# Patient Record
(demographics unavailable — no encounter records)

---

## 2024-10-15 NOTE — REVIEW OF SYSTEMS
[Recent Weight Loss (___ Lbs)] : recent [unfilled] ~Ulb weight loss [Dry Eyes] : dryness of the eyes [As Noted in HPI] : as noted in HPI [Joint Pain] : joint pain [Negative] : Heme/Lymph

## 2024-10-18 NOTE — ASSESSMENT
[FreeTextEntry1] : Impression: GUY BLOOM is a 70 year old man who presents for follow up office visit for further evaluation of joint symptoms and rheumatic diseases including MCTD, osteoarthritis, osteopenia, Sjogren's syndrome, Raynaud's, and previous history of dermatomyositis, interstitial lung disease/pulmonary fibrosis.   Daily Raynauds several times per day - not very bothersome. No rash, oral/genital ulcers, alopecia, chest pain, fever,  or other joint symptoms - with his MCTD quiescent. Occasional right shoulder pain Secondary to osteoarthritis. From previous physical exam mild tenderness plantar arch of left foot - resolved. On exam pt has flexor tenosynovitis right 1st finger contributing to his joint pain and mild diffuse swelling bilateral fingers secondary to MCTD. Some recent dry eyes and dry mouth - on exam pt has dry eyes, tongue moist and mild enlarged bilateral parotid glands nontender Secondary to Sjogren Syndrome. Using artificial tears, biotene mouthwash, oral hydration and cevimeline t.i.d. with relief. Denies dysphagia, difficulty fixing hair, difficulty with stairs, or weakness with his remote history of dermatomyositis quiescent. No respiratory symptoms, shortness of breath or cough/sputum production, hemoptysis, ankle swelling, other joint symptoms - with his interstitial lung disease/pulmonary fibrosis stable.  He continues Ofev via pulmonary.  Short course prednisone very helpful. Gym daily for 2 hours with [employer] classes and weights. Recent laboratory tests results reveal no significant changes or results, with extensive discussion. Recent xray results revealed left calcaneal spurs, osteoarthritis in the right shoulder -- with extensive discussion. Recent MRI of bilateral parotid gland results revealed "multinodular appearance ... that is typical with Sjogren Syndrome" -- with extensive discussion. The patient continues calcium 500 mg and vitamin D supplements for osteopenia. Patient denies rash or side effects with current medications. Patient is content with current medication regimen.  Plan: I reviewed patient's chart and previous records I reviewed recent lab test results with the patient  with extensive discussion  X-rays results reviewed with the patient with extensive discussion I reviewed recent MRI of bilateral parotid gland with patient with extensive discussion Laboratory tests ordered - see list below - with coordination of care Ophthalmology f/u for Plaquenil monitoring q.6 months - emphasized  -- please send me a consult report Diagnosis and prognosis discussed Continue current medications (other than those changed below) Patient declines any changes or additions to medication regimen. Artificial tears one drop each eye q.i.d. and p.r.n.(Possible side effects explained) Biotene mouthwash/spray q.i.d. and p.r.n.(Possible side effects explained) Oral Hydration Sun protection and sunscreen with an SPF of 50 or higher use emphasized--extensive discussion Keep hands and feet and torso warm - patient warned of dangers of gangrene/digital amputation with Raynaud's -- with extensive discussion Increase home thermostat to at least 72 to 74 F -- with extensive discussion Pulmonary follow-up Return visit 2 months All questions and concerns were addressed. Total time for this office visit, including face-to-face time and non-face-to-face time, 72 minutes--- including review of the chart and previous records, detailed review of his medical history, review of previous lab results with extensive discussion with the patient, ordering lab tests with coordination of care, review of recent imaging reports/x-ray results with extensive discussion with the patient, review of his recent MRI bilateral parotid glands with extensive discussion, detailed medication history, review of medications going forward with their possible side effects, reviewed protocol for prevention of Raynaud's with extensive discussion as noted above, reviewed modalities for treatment of his dryness, reviewed protocol for sun protection and use of sunscreen with extensive discussion

## 2024-10-18 NOTE — ADDENDUM
[FreeTextEntry1] :  I, Paulie Rahman, acted solely as a scribe for Dr. Myron I. Kleiner, MD. on 10/15/2024. I personally performed the services described in the documentation, reviewed the documentation recorded by the scribe in my presence, and it accurately and completely records my words and actions.

## 2024-10-18 NOTE — PHYSICAL EXAM
[General Appearance - Alert] : alert [General Appearance - In No Acute Distress] : in no acute distress [General Appearance - Well Nourished] : well nourished [General Appearance - Well Developed] : well developed [General Appearance - Well-Appearing] : healthy appearing [Sclera] : the sclera and conjunctiva were normal [PERRL With Normal Accommodation] : pupils were equal in size, round, and reactive to light [Extraocular Movements] : extraocular movements were intact [Oropharynx] : the oropharynx was normal [Outer Ear] : the ears and nose were normal in appearance [Neck Appearance] : the appearance of the neck was normal [Neck Cervical Mass (___cm)] : no neck mass was observed [Jugular Venous Distention Increased] : there was no jugular-venous distention [Thyroid Diffuse Enlargement] : the thyroid was not enlarged [Lungs Percussion] : the lungs were normal to percussion [Heart Rate And Rhythm] : heart rate was normal and rhythm regular [Edema] : there was no peripheral edema [Abdomen Soft] : soft [Abdomen Tenderness] : non-tender [Abdomen Mass (___ Cm)] : no abdominal mass palpated [Cervical Lymph Nodes Enlarged Posterior Bilaterally] : posterior cervical [Cervical Lymph Nodes Enlarged Anterior Bilaterally] : anterior cervical [Supraclavicular Lymph Nodes Enlarged Bilaterally] : supraclavicular [Axillary Lymph Nodes Enlarged Bilaterally] : axillary [No CVA Tenderness] : no ~M costovertebral angle tenderness [No Spinal Tenderness] : no spinal tenderness [Skin Color & Pigmentation] : normal skin color and pigmentation [Skin Turgor] : normal skin turgor [] : no rash [Cranial Nerves] : cranial nerves 2-12 were intact [Sensation] : the sensory exam was normal to light touch and pinprick [Motor Exam] : the motor exam was normal [No Focal Deficits] : no focal deficits [Oriented To Time, Place, And Person] : oriented to person, place, and time [Impaired Insight] : insight and judgment were intact [Affect] : the affect was normal [Mood] : the mood was normal [FreeTextEntry1] : Shoulders/Wrists/Elbow- Normal  Hands- early Heberden's nodes, mild diffuse swelling bilateral fingers, flexor tenosynovitis right 1st finger,    Hips- bilateral decreased external rotation  Knees- bilateral crepitus Ankles/Feet- normal

## 2024-10-18 NOTE — CONSULT LETTER
[Dear  ___] : Dear  [unfilled], [Consult Letter:] : I had the pleasure of evaluating your patient, [unfilled]. [Please see my note below.] : Please see my note below. [Consult Closing:] : Thank you very much for allowing me to participate in the care of this patient.  If you have any questions, please do not hesitate to contact me. [Sincerely,] : Sincerely, [DrMary  ___] : Dr. KLINE [DrMary ___] : Dr. KLINE [___] : [unfilled] [FreeTextEntry3] :       Myron Myron I. Kleiner, M.D., FACR   Chief, Division of Rheumatology  Department of Medicine   [facility]   Chief, Division of Rheumatology  Department of Medicine   [facility]  Myron Myron I. Kleiner, M.D., FACR   Chief, Division of Rheumatology  Department of Medicine   [facility]   Chief, Division of Rheumatology  Department of [facility]

## 2024-10-18 NOTE — CONSULT LETTER
[Dear  ___] : Dear  [unfilled], [Consult Letter:] : I had the pleasure of evaluating your patient, [unfilled]. [Please see my note below.] : Please see my note below. [Consult Closing:] : Thank you very much for allowing me to participate in the care of this patient.  If you have any questions, please do not hesitate to contact me. [Sincerely,] : Sincerely, [DrMary  ___] : Dr. KLINE [DrMary ___] : Dr. KLINE [___] : [unfilled] [FreeTextEntry3] :       Myron Myron I. Kleiner, M.D., FACR   Chief, Division of Rheumatology  Department of Medicine   [facility]   Chief, Division of Rheumatology  Department of Medicine   [facility]  Myron Myron I. Kleiner, M.D., FACR   Chief, Division of Rheumatology  Department of Medicine   [facility]   Chief, Division of Rheumatology  Department of [facility]

## 2024-10-18 NOTE — HISTORY OF PRESENT ILLNESS
[FreeTextEntry1] : GUY BLOOM is a 70 year old man who presents for follow up office visit for further evaluation of joint symptoms and rheumatic diseases including MCTD, osteoarthritis, Sjogren's syndrome, Raynaud's, osteopenia, and previous history of dermatomyositis, interstitial lung disease/pulmonary fibrosis.   Daily Raynauds several times per day - not very bothersome.  No rash, oral/genital ulcers, alopecia, chest pain, fever,  or other joint symptoms. Occasional right shoulder pain. Some recent dry eyes and dry mouth. Using artificial tears, biotene mouthwash, oral hydration and cevimeline t.i.d. with relief. Denies dysphagia, difficulty fixing hair, difficulty with stairs, or weakness. No respiratory symptoms, shortness of breath or cough/sputum production, hemoptysis, ankle swelling, other joint symptoms.  On Ofev via pulmonary.  Short course prednisone very helpful. Gym daily for 2 hours with [employer] classes and weights. The patient continues calcium 500 mg and vitamin D supplements. Patient denies rash or side effects with current medications. Patient is content with current medication regimen.  PMH August covid postive no medication no hospitalization sx cough

## 2024-12-15 NOTE — CONSULT LETTER
[Dear  ___] : Dear  [unfilled], [Consult Letter:] : I had the pleasure of evaluating your patient, [unfilled]. [Please see my note below.] : Please see my note below. [Consult Closing:] : Thank you very much for allowing me to participate in the care of this patient.  If you have any questions, please do not hesitate to contact me. [Sincerely,] : Sincerely, [DrMary  ___] : Dr. KLINE [DrMary ___] : Dr. KLINE [___] : [unfilled] [FreeTextEntry3] :       Myron Myron I. Kleiner, M.D., FACR   Chief, Division of Rheumatology  Department of Medicine   [facility]   Chief, Division of Rheumatology  Department of Medicine   [facility]  Myron Myron I. Kleiner, M.D., FACR   Chief, Division of Rheumatology  Department of Medicine   [facility]   Chief, Division of Rheumatology  Department of [facility]

## 2024-12-15 NOTE — ASSESSMENT
[FreeTextEntry1] : Impression: GUY BLOOM is a 70 year old man who presents for follow up office visit for further evaluation of joint symptoms and rheumatic diseases including MCTD, osteoarthritis, Sjogren's syndrome, osteopenia, Raynaud's, and previous history of dermatomyositis, interstitial lung disease/pulmonary fibrosis. Accompanied buy his wife  Intermittent pain right knee, right shoulder, and plantar aspect left heel Secondary to osteoarthritis. Paresthesias bilateral fingers anytime of day consider bilateral carpal tunnel syndrome vs cervical radiculopathy. Occasional Raynauds Episodes -- not bothersome. On exam pt has flexor tenosynovitis right 1st finger contributing to his joint pain and mild diffuse swelling bilateral fingers secondary to MCTD. No rash, oral/genital ulcers, alopecia, chest pain, fever, or other joint symptoms with his MCTD otherwise quiescent. Denies dysphagia, difficulty fixing hair, difficulty shaving, difficulty with stairs, or weakness with his remote history of dermatomyositis quiescent. Some recent dry eyes and dry mouth - on exam pt has dry eyes, tongue moist and mild enlarged bilateral parotid glands nontender Secondary to Sjogren Syndrome. Using artificial tears, biotene mouthwash, oral hydration and cevimeline t.i.d. with relief. Applies Diclofenac gel to his right knee, right shoulder, left heel twice daily with relief. Pt described "roughen skin" bilateral 2nd and 3rd fingers despite moisturizing. Recent lab tests results revealed ESR 35, otherwise unrevealing-- with extensive discussion. Recent Chest CT scan results from revealed diffuse interstitial pulmonary fibrosis without change from previous study and small 10 X 8 millimeter subpleural nodule right lung base smaller compared to previous study -- with extensive discussion. The patient continues calcium 500 mg and vitamin D supplements for osteopenia. Patient denies rash or side effects with current medications. Patient is content with current medication regimen.  Plan: I reviewed patient's chart and previous records I reviewed recent lab test results with the patient and his wife with extensive discussion  Recent Chest CT scan results reviewed with the patient and his wife with extensive discussion Laboratory tests ordered - see list below - with coordination of care Diagnosis and prognosis discussed Continue current medications (other than those changed below) Consider switching the nabumetone to alternative NSAID--pt declined alternative NSAIDs - he wants to wait till after his [place] cruise--extensive discussion Patient declines any changes or additions to medication regimen. Artificial tears one drop each eye q.i.d. and p.r.n.(Possible side effects explained) Biotene mouthwash/spray q.i.d. and p.r.n.(Possible side effects explained) Oral Hydration Sun protection and sunscreen with an SPF of 50 or higher use emphasized--extensive discussion Keep hands and feet and torso warm - patient warned of dangers of gangrene/digital amputation with Raynaud's -- with extensive discussion Increase home thermostat to at least 72 to 74 F -- with extensive discussion Ophthalmology f/u for Plaquenil monitoring q.6 months - emphasized  -- please send me a consult report Return visit 2 months All questions and concerns were addressed. Total time for this office visit, including face-to-face time and non-face-to-face time, 71 minutes--- including review of the chart and previous records, detailed review of his medical history, review of previous lab results with extensive discussion with the patient and his wife, ordering lab tests with coordination of care, review of previous imaging reports/x-ray results with extensive discussion with the patient, review of recent chest CAT scan results with extensive discussion with the patient and his wife, detailed medication history, review of medications going forward with their possible side effects, reviewed protocol for prevention of Raynaud's with extensive discussion as noted above, reviewed modalities for treatment of his dryness with extensive discussion, reviewed protocol for sun protection and use of sunscreen with extensive discussion with the patient and his wife, extensive discussion regarding switching nabumetone to alternative NSAID but he declined at this time as noted above

## 2024-12-15 NOTE — PHYSICAL EXAM
[General Appearance - Alert] : alert [General Appearance - In No Acute Distress] : in no acute distress [General Appearance - Well Nourished] : well nourished [General Appearance - Well Developed] : well developed [General Appearance - Well-Appearing] : healthy appearing [Sclera] : the sclera and conjunctiva were normal [PERRL With Normal Accommodation] : pupils were equal in size, round, and reactive to light [Extraocular Movements] : extraocular movements were intact [Outer Ear] : the ears and nose were normal in appearance [Oropharynx] : the oropharynx was normal [Neck Appearance] : the appearance of the neck was normal [Neck Cervical Mass (___cm)] : no neck mass was observed [Jugular Venous Distention Increased] : there was no jugular-venous distention [Thyroid Diffuse Enlargement] : the thyroid was not enlarged [Lungs Percussion] : the lungs were normal to percussion [Cervical Lymph Nodes Enlarged Posterior Bilaterally] : posterior cervical [Cervical Lymph Nodes Enlarged Anterior Bilaterally] : anterior cervical [Supraclavicular Lymph Nodes Enlarged Bilaterally] : supraclavicular [Axillary Lymph Nodes Enlarged Bilaterally] : axillary [No CVA Tenderness] : no ~M costovertebral angle tenderness [No Spinal Tenderness] : no spinal tenderness [Heart Rate And Rhythm] : heart rate was normal and rhythm regular [Edema] : there was no peripheral edema [Skin Color & Pigmentation] : normal skin color and pigmentation [Skin Turgor] : normal skin turgor [] : no rash [Sensation] : the sensory exam was normal to light touch and pinprick [No Focal Deficits] : no focal deficits [Motor Exam] : the motor exam was normal [FreeTextEntry1] : Aravind-5/5  [Oriented To Time, Place, And Person] : oriented to person, place, and time [Impaired Insight] : insight and judgment were intact [Affect] : the affect was normal [Mood] : the mood was normal

## 2024-12-15 NOTE — HISTORY OF PRESENT ILLNESS
[FreeTextEntry1] : GUY BLOOM is a 70 year old man who presents for follow up office visit for further evaluation of joint symptoms and rheumatic diseases including MCTD, osteoarthritis, osteopenia, Sjogren's syndrome, Raynaud's, and previous history of dermatomyositis, interstitial lung disease/pulmonary fibrosis. Accompanied by his wife   Intermittent pain right knee, right shoulders, and plantar aspect left heel. Paresthesias bilateral fingers anytime of day. Occasional Raynauds Episodes -- not bothersome.  No rash, oral/genital ulcers, alopecia, chest pain, fever, Raynaud's, or other joint symptoms. Denies dysphagia, difficulty fixing hair, difficulty shaving, difficulty with stairs, or weakness. Some recent dry eyes and dry mouth. Using artificial tears, biotene mouthwash, oral hydration and cevimeline t.i.d. with relief. Applies Diclofenac gel to his right knee, right shoulder, left heel twice daily with relief. Pt described "roughen skin" bilateral 2nd and 3rd fingers despite moisturizing. The patient continues calcium 500 mg and vitamin D supplements. Patient denies rash or side effects with current medications. Patient is content with current medication regimen.  [facility] Ophthalmology - pt states "all ok"  SH January - planned [place] cruise

## 2024-12-15 NOTE — CONSULT LETTER
[Dear  ___] : Dear  [unfilled], [Consult Letter:] : I had the pleasure of evaluating your patient, [unfilled]. [Please see my note below.] : Please see my note below. [Consult Closing:] : Thank you very much for allowing me to participate in the care of this patient.  If you have any questions, please do not hesitate to contact me. [Sincerely,] : Sincerely, [DrMary  ___] : Dr. KLINE [DrMary ___] : Dr. LKINE [___] : [unfilled] [FreeTextEntry3] :       Myron Myron I. Kleiner, M.D., FACR   Chief, Division of Rheumatology  Department of Medicine   [facility]   Chief, Division of Rheumatology  Department of Medicine   [facility]  Myron Myron I. Kleiner, M.D., FACR   Chief, Division of Rheumatology  Department of Medicine   [facility]   Chief, Division of Rheumatology  Department of [facility]

## 2024-12-15 NOTE — HISTORY OF PRESENT ILLNESS
[FreeTextEntry1] : GUY BLOOM is a 70 year old man who presents for follow up office visit for further evaluation of joint symptoms and rheumatic diseases including MCTD, osteoarthritis, osteopenia, Sjogren's syndrome, Raynaud's, and previous history of dermatomyositis, interstitial lung disease/pulmonary fibrosis. Accompanied by his wife   Intermittent pain right knee, right shoulders, and plantar aspect left heel. Paresthesias bilateral fingers anytime of day. Occasional Raynauds Episodes -- not bothersome.  No rash, oral/genital ulcers, alopecia, chest pain, fever, Raynaud's, or other joint symptoms. Denies dysphagia, difficulty fixing hair, difficulty shaving, difficulty with stairs, or weakness. Some recent dry eyes and dry mouth. Using artificial tears, biotene mouthwash, oral hydration and cevimeline t.i.d. with relief. Applies Diclofenac gel to his right knee, right shoulder, left heel twice daily with relief. Pt described "roughen skin" bilateral 2nd and 3rd fingers despite moisturizing. The patient continues calcium 500 mg and vitamin D supplements. Patient denies rash or side effects with current medications. Patient is content with current medication regimen.  [facility] Ophthalmology - pt states "all ok"  SH January - planned [place] cruise

## 2024-12-15 NOTE — ADDENDUM
[FreeTextEntry1] :  I, Paulie Rahman, acted solely as a scribe for Dr. Myron I. Kleiner, MD. on 12/11/2024. I personally performed the services described in the documentation, reviewed the documentation recorded by the scribe in my presence, and it accurately and completely records my words and actions.

## 2025-03-03 NOTE — PHYSICAL EXAM
[General Appearance - Alert] : alert [General Appearance - In No Acute Distress] : in no acute distress [General Appearance - Well Nourished] : well nourished [General Appearance - Well Developed] : well developed [General Appearance - Well-Appearing] : healthy appearing [Sclera] : the sclera and conjunctiva were normal [PERRL With Normal Accommodation] : pupils were equal in size, round, and reactive to light [Extraocular Movements] : extraocular movements were intact [Outer Ear] : the ears and nose were normal in appearance [Oropharynx] : the oropharynx was normal [Neck Appearance] : the appearance of the neck was normal [Neck Cervical Mass (___cm)] : no neck mass was observed [Jugular Venous Distention Increased] : there was no jugular-venous distention [Thyroid Diffuse Enlargement] : the thyroid was not enlarged [] : no respiratory distress [Lungs Percussion] : the lungs were normal to percussion [Cervical Lymph Nodes Enlarged Posterior Bilaterally] : posterior cervical [Cervical Lymph Nodes Enlarged Anterior Bilaterally] : anterior cervical [Supraclavicular Lymph Nodes Enlarged Bilaterally] : supraclavicular [Axillary Lymph Nodes Enlarged Bilaterally] : axillary [No CVA Tenderness] : no ~M costovertebral angle tenderness [No Spinal Tenderness] : no spinal tenderness [FreeTextEntry1] : TMJs - mild tenderness left  Shoulders/Wrists/Elbow- Normal  Hands- early Heberden's nodes, mild diffuse swelling bilateral fingers, flexor tenosynovitis right 1st finger,    Hips- bilateral decreased external rotation  Knees- bilateral crepitus Ankles/Feet- normal   Strength 5/5

## 2025-03-03 NOTE — CONSULT LETTER
[Dear  ___] : Dear  [unfilled], [Consult Letter:] : I had the pleasure of evaluating your patient, [unfilled]. [Please see my note below.] : Please see my note below. [Consult Closing:] : Thank you very much for allowing me to participate in the care of this patient.  If you have any questions, please do not hesitate to contact me. [Sincerely,] : Sincerely, [DrMary  ___] : Dr. KLINE [DrMary ___] : Dr. KLINE [___] : [unfilled] [FreeTextEntry3] :       Myron Myron I. Kleiner, M.D., FACR   Chief, Division of Rheumatology  Department of Medicine   [facility]   Chief, Division of Rheumatology  Department of Medicine   [facility]  Myron Myron I. Kleiner, M.D., FACR   Chief, Division of Rheumatology  Department of Medicine   [facility]   Chief, Division of Rheumatology  Department of [facility]

## 2025-03-03 NOTE — ASSESSMENT
[FreeTextEntry1] : Impression: GUY BLOOM is a 70 year old man who presents for follow up office visit for further evaluation of joint symptoms and rheumatic diseases including MCTD, osteoarthritis, Sjogren's syndrome, Raynaud's, osteopenia, and previous history of dermatomyositis, interstitial lung disease/pulmonary fibrosis  Patient feels fairly well. One week history of left TMJ pain - on exam pt has tender left TMJ. "Pulled" left anterior thigh - resolving. Left heel pain - resolved. Mild tenderness base of right thumb Secondary to osteoarthritis. On exam pt has flexor tenosynovitis right 1st finger contributing to his joint pain and mild diffuse swelling bilateral fingers secondary to MCTD. No rash, oral/genital ulcers, alopecia, chest pain, fever, or other joint symptoms otherwise his MCTD is quiescent. Occasional Raynauds Episodes -- not bothersome. Denies dysphagia, difficulty fixing hair, difficulty with stairs, or weakness with his remote history of dermatomyositis quiescent. Denies recent dry eyes and dry mouth - on exam pt has mild enlarged bilateral parotid glands nontender Secondary to Sjogren Syndrome. Using artificial tears, oral hydration and cevimeline t.i.d. with relief. No respiratory symptoms, shortness of breath or cough/sputum production, hemoptysis, ankle swelling, other joint symptoms with his interstitial lung disease/pulmonary fibrosis. Cardio exercise 3-4 times a week for 2 hours via exercise classes. Recent lab tests results revealed ESR 28, CPK 85 -- with extensive discussion. The patient continues calcium 500 mg and vitamin D supplements for osteopenia. Patient denies rash or side effects with current medications. Patient is content with current medication regimen.  Plan: I reviewed patient's chart and previous records I reviewed recent lab test results with the patient and his wife with extensive discussion  Laboratory tests ordered - see list below - with coordination of care Diagnosis and prognosis discussed Continue current medications (other than those changed below) Patient declines any changes or additions to medication regimen. Artificial tears one drop each eye q.i.d. and p.r.n.(Possible side effects explained) Biotene mouthwash/spray q.i.d. and p.r.n.(Possible side effects explained) Oral Hydration Sun protection and sunscreen with an SPF of 50 or higher use emphasized--extensive discussion Keep hands and feet and torso warm - patient warned of dangers of gangrene/digital amputation with Raynaud's -- with extensive discussion Increase home thermostat to at least 72 to 74 F -- with extensive discussion Cardiology f/u regarding echocardiogram to monitor for pulmonary hypertension -- with coordination of care -- please send me consult report  Return visit 2 months All questions and concerns were addressed.

## 2025-03-03 NOTE — CONSULT LETTER
[Dear  ___] : Dear  [unfilled], [Consult Letter:] : I had the pleasure of evaluating your patient, [unfilled]. [Please see my note below.] : Please see my note below. [Consult Closing:] : Thank you very much for allowing me to participate in the care of this patient.  If you have any questions, please do not hesitate to contact me. [Sincerely,] : Sincerely, [DrMary  ___] : Dr. KLINE [DrMary ___] : Dr. KLINE [___] : [unfilled] [FreeTextEntry3] :       Myron Myron I. Kleiner, M.D., FACR   Chief, Division of Rheumatology  Department of Medicine   [facility]   Chief, Division of Rheumatology  Department of Medicine   [facility]  Myron Myron I. Kleiner, M.D., FACR   Chief, Division of Rheumatology  Department of Medicine   [facility]   Chief, Division of Rheumatology  Department of [facility]

## 2025-03-03 NOTE — CONSULT LETTER
[Dear  ___] : Dear  [unfilled], [Consult Letter:] : I had the pleasure of evaluating your patient, [unfilled]. [Please see my note below.] : Please see my note below. [Consult Closing:] : Thank you very much for allowing me to participate in the care of this patient.  If you have any questions, please do not hesitate to contact me. [Sincerely,] : Sincerely, [DrMary  ___] : Dr. KLINE [DrMary ___] : Dr. KLINE [___] : [unfilled] [FreeTextEntry3] :       Myron Myron I. Kleiner, M.D., FACR   Chief, Division of Rheumatology  Department of Medicine   [facility]   Chief, Division of Rheumatology  Department of Medicine   [facility]  Myron Myron I. Kleiner, M.D., FACR   Chief, Division of Rheumatology  Department of Medicine   [facility]   Chief, Division of Rheumatology  Department of [facility]

## 2025-03-03 NOTE — ADDENDUM
[FreeTextEntry1] :  I, Paulie Rahman, acted solely as a scribe for Dr. Myron I. Kleiner, MD. on 02/26/2025. I personally performed the services described in the documentation, reviewed the documentation recorded by the scribe in my presence, and it accurately and completely records my words and actions.

## 2025-03-03 NOTE — HISTORY OF PRESENT ILLNESS
[FreeTextEntry1] : GUY BLOOM is a 70 year old man who presents for follow up office visit for further evaluation of joint symptoms and rheumatic diseases including MCTD, osteoarthritis, osteopenia, Sjogren's syndrome, Raynaud's, and previous history of dermatomyositis, interstitial lung disease/pulmonary fibrosis.   Patient feels fairly well. One week of left TMJ pain. "Pulled" left anterior thigh - resolving.  He has been applying the diclofenac gel to his right thigh.  Left heel pain - resolved. Mild tenderness base of right thumb. No rash, oral/genital ulcers, alopecia, chest pain, fever, or other joint symptoms.  Occasional Raynauds Episodes -- not bothersome. Denies dysphagia, difficulty fixing hair, difficulty with stairs, or weakness. Denies recent dry eyes and dry mouth. Using artificial tears, oral hydration and cevimeline t.i.d. with relief. No respiratory symptoms, shortness of breath or cough/sputum production, hemoptysis, ankle swelling, other joint symptoms. Cardio exercise 3-4 times a week for 2 hours via exercise classes. The patient continues calcium 500 mg and vitamin D supplements. Patient denies rash or side effects with current medications. Patient is content with current medication regimen.   January - [place] cruise

## 2025-03-25 NOTE — ASSESSMENT
[FreeTextEntry1] : Middle-aged white male who has a history of multiple has a primary hypothyroidism for which she is taking levothyroxine 50 mcg tablet daily.  His last blood test was To[place] of 2024 and the TSH level normal at 1.91.  He also had a blood test done in December 2024 when the TSH was 1.96, complete metabolic panel was normal.  Patient is clinically euthyroid and compliant with his medication.  Patient does have his arthralgias due to his scattered osteoarthritis together with the mixed connective tissue disease.  Recommend 1.  I have advised the patient to continue the levothyroxine 50 mcg tablets daily.  Patient is aware that he has to take it early in the morning on empty stomach. 2.  Patient wIll continue with the remaining medications and if stable he will follow-up in 1 years time.  The plan was discussed with the patient.  Patient also was advised to change his eating habits and to reduce the portion sizes of the meals in order to lose weight.

## 2025-03-25 NOTE — HISTORY OF PRESENT ILLNESS
[FreeTextEntry1] : 71-year-old white male with a past medical history of primary hypothyroidism now presents for routine follow-up.  Patient has multiple autoimmune problems which include mixed connective tissue disease, interstitial lung disease, dermatomyositis, Sjogren's syndrome.  Patient is followed closely by his rheumatologist and the medications are continuously adjusted.  Patient denies any signs or symptoms of hypothyroidism.  He claimed that his weight has been fluctuating and his appetite is good but now he is starting low calorie diet.  Physically he is active and he attends classes for yoga.  He denies any recent falls or fractures.  He has not noticed any palpitations, chest pain, shortness of breath, nausea vomiting.  He he does get occasional chest swelling of different joints of the body.  His current medications include a baby aspirin, atorvastatin, calcium supplements,CEVIMELINE Dexilant, diclofenac, duloxetine, famotidine, nabumetone, Ofev, tamsulosin, torsemide as needed, and vitamin D supplements.

## 2025-03-25 NOTE — PHYSICAL EXAM
[Alert] : alert [Well Nourished] : well nourished [No Acute Distress] : no acute distress [Well Developed] : well developed [Normal Sclera/Conjunctiva] : normal sclera/conjunctiva [EOMI] : extra ocular movement intact [No Proptosis] : no proptosis [Normal Oropharynx] : the oropharynx was normal [Thyroid Not Enlarged] : the thyroid was not enlarged [No Thyroid Nodules] : no palpable thyroid nodules [No Respiratory Distress] : no respiratory distress [No Accessory Muscle Use] : no accessory muscle use [Clear to Auscultation] : lungs were clear to auscultation bilaterally [Normal S1, S2] : normal S1 and S2 [Normal Rate] : heart rate was normal [Regular Rhythm] : with a regular rhythm [Carotids Normal] : carotid pulses were normal with no bruits [No Edema] : no peripheral edema [Pedal Pulses Normal] : the pedal pulses are present [Normal Bowel Sounds] : normal bowel sounds [Not Tender] : non-tender [Not Distended] : not distended [Soft] : abdomen soft [No HSM] : no hepato-splenomegaly [Normal Anterior Cervical Nodes] : no anterior cervical lymphadenopathy [Normal Posterior Cervical Nodes] : no posterior cervical lymphadenopathy [No Spinal Tenderness] : no spinal tenderness [Spine Straight] : spine straight [No Stigmata of Cushings Syndrome] : no stigmata of Cushings Syndrome [Normal Gait] : normal gait [Normal Strength/Tone] : muscle strength and tone were normal [No Rash] : no rash [No Skin Lesions] : no skin lesions [Acanthosis Nigricans] : no acanthosis nigricans [No Motor Deficits] : the motor exam was normal [Normal Reflexes] : deep tendon reflexes were 2+ and symmetric [No Tremors] : no tremors [Oriented x3] : oriented to person, place, and time [de-identified] : Patient BMI is 32.54 weight is 214 pounds [de-identified] : Soft systolic murmur [de-identified] : Truncal obesity

## 2025-05-01 NOTE — ADDENDUM
[FreeTextEntry1] :  I, Paulie Rahman, acted solely as a scribe for Dr. Myron I. Kleiner, MD. on 04/28/2025. I personally performed the services described in the documentation, reviewed the documentation recorded by the scribe in my presence, and it accurately and completely records my words and actions.

## 2025-05-01 NOTE — PHYSICAL EXAM
[General Appearance - Alert] : alert [General Appearance - In No Acute Distress] : in no acute distress [General Appearance - Well Nourished] : well nourished [General Appearance - Well Developed] : well developed [General Appearance - Well-Appearing] : healthy appearing [Sclera] : the sclera and conjunctiva were normal [PERRL With Normal Accommodation] : pupils were equal in size, round, and reactive to light [Extraocular Movements] : extraocular movements were intact [Outer Ear] : the ears and nose were normal in appearance [Oropharynx] : the oropharynx was normal [Neck Appearance] : the appearance of the neck was normal [Neck Cervical Mass (___cm)] : no neck mass was observed [Jugular Venous Distention Increased] : there was no jugular-venous distention [Thyroid Diffuse Enlargement] : the thyroid was not enlarged [Lungs Percussion] : the lungs were normal to percussion [Heart Rate And Rhythm] : heart rate was normal and rhythm regular [Edema] : there was no peripheral edema [Abdomen Soft] : soft [Abdomen Tenderness] : non-tender [Abdomen Mass (___ Cm)] : no abdominal mass palpated [Cervical Lymph Nodes Enlarged Posterior Bilaterally] : posterior cervical [Cervical Lymph Nodes Enlarged Anterior Bilaterally] : anterior cervical [Supraclavicular Lymph Nodes Enlarged Bilaterally] : supraclavicular [Axillary Lymph Nodes Enlarged Bilaterally] : axillary [No CVA Tenderness] : no ~M costovertebral angle tenderness [No Spinal Tenderness] : no spinal tenderness [Skin Color & Pigmentation] : normal skin color and pigmentation [Skin Turgor] : normal skin turgor [] : no rash [Cranial Nerves] : cranial nerves 2-12 were intact [Sensation] : the sensory exam was normal to light touch and pinprick [Motor Exam] : the motor exam was normal [No Focal Deficits] : no focal deficits [Oriented To Time, Place, And Person] : oriented to person, place, and time [Impaired Insight] : insight and judgment were intact [Affect] : the affect was normal [Mood] : the mood was normal [FreeTextEntry1] : Strength 5/5, able to arise from chair without use of upper extremities

## 2025-05-01 NOTE — ASSESSMENT
[FreeTextEntry1] : Impression: GUY BLOOM is a 71 year old man who presents for follow up office visit for further evaluation of joint symptoms and rheumatic diseases including MCTD, osteoarthritis, osteopenia, Sjogren's syndrome, Raynaud's, and previous history of dermatomyositis, interstitial lung disease/pulmonary fibrosis  Patient feels fairly well. Past few weeks myalgias bilateral thighs and right shoulder - mild tenderness bilateral upper arms and mild tenderness bilateral anterior thighs consider Secondary to remote history of dermatomyositis vs current statins.  In addition, he has eaten raw beef.  Denies dysphagia, difficulty fixing hair, difficulty shaving, difficulty with stairs, or weakness otherwise his previous history of dermatomyositis quiescent. On exam pt has normal strength and flexor tenosynovitis right 1st finger contributing to his joint pain and mild diffuse swelling bilateral fingers secondary to MCTD. No rash, oral/genital ulcers, alopecia, chest pain, fever, Raynaud's, or other joint symptoms other his MCTD under good control. Some recent dry eyes and dry mouth - on exam pt has dry eyes, tongue moist and mild enlarged bilateral parotid glands nontender Secondary to Sjogren Syndrome. Using artificial tears, oral hydration and cevimeline t.i.d. with relief. No respiratory symptoms, shortness of breath or cough/sputum production, hemoptysis, ankle swelling, other joint symptoms.  His interstitial lung disease and pulmonary fibrosis appears to be clinically stable at this time.  He has had raw beef and denies undercook pork. Recent lab tests results revealed CPK 74 (), ESR 30, aldolase 4.4 (3.3-10.3) -- with extensive discussion. The patient continues calcium 500 mg and vitamin D supplements for osteopenia. Patient denies rash or side effects with current medications. Patient is content with current medication regimen.  Plan: I reviewed patient's chart and previous records I reviewed recent lab test results with the patient  with extensive discussion  Laboratory tests ordered - see list below - with coordination of care Diagnosis and prognosis discussed Continue current medications (other than those changed below) Discontinue Atorvastatin for 2 weeks - if the myalgias resolved after one week call office with progress report; if myalgias don't resolve after 1 week call with a progress report in 2 weeks-- with extensive discussion Artificial tears one drop each eye q.i.d. and p.r.n.(Possible side effects explained) Biotene mouthwash/spray q.i.d. and p.r.n.(Possible side effects explained) Oral Hydration Sun protection and sunscreen with an SPF of 50 or higher use emphasized--extensive discussion Keep hands and feet and torso warm - patient warned of dangers of gangrene/digital amputation with Raynaud's -- with extensive discussion Increase home thermostat to at least 72 to 74 F -- with extensive discussion Advised against eating raw beef -- with extensive discussion I request recent consult report from Cardiology and echocardiogram -- emphasized -- with coordination of care I request consult report from Pulmonary and results of PFTs -- emphasized -- with coordination of care Return visit 2 months All questions and concerns were addressed. Total time for this office visit, including face-to-face time and non-face-to-face time, 86 minutes--- including review of the chart and previous records, detailed review of his medical history, review of previous lab results with extensive discussion with the patient, ordering lab tests with coordination of care, review of  previous imaging reports/x-ray results, detailed medication history, review of medications going forward with their possible side effects, extensive discussion regarding the plan to discontinue her atorvastatin temporarily with close monitoring of her myalgia as noted above, reviewed protocol for prevention of Raynaud's with extensive discussion as noted above, reviewed modalities for treatment of his dryness with extensive discussion, reviewed protocol for sun protection and use of sunscreen as noted above, extensive discussion regarding need to obtain cardiology and pulmonary consultation reports and echocardiogram and PFT results as noted above, extensive discussion regarding the need not to eat raw beef as noted above, reviewed the impact of the patient's rheumatic disease on their other medical problems, reviewed the impact of the patient's other medical problems on their rheumatic disease

## 2025-05-01 NOTE — HISTORY OF PRESENT ILLNESS
[FreeTextEntry1] : GUY BLOOM is a 71 year old man who presents for follow up office visit for further evaluation of joint symptoms and rheumatic diseases including MCTD, osteoarthritis, osteopenia, Sjogren's syndrome, Raynaud's, and previous history of dermatomyositis, interstitial lung disease/pulmonary fibrosis, osteopenia.   Patient feels fairly well. Past few weeks myalgias bilateral thighs and right shoulder. Denies dysphagia, difficulty fixing hair, difficulty shaving, difficulty with stairs, or weakness. No rash, oral/genital ulcers, alopecia, chest pain, fever, Raynaud's, or other joint symptoms. Some  recent dry eyes and dry mouth. Using artificial tears, oral hydration and cevimeline t.i.d. with relief. No respiratory symptoms, shortness of breath or cough/sputum production, hemoptysis, ankle swelling, other joint symptoms. he has had raw beef and denies undercook pork. The patient continues calcium 500 mg and vitamin D supplements. Patient denies rash or side effects with current medications. Patient is content with current medication regimen.  [facility] Cardiology f/u - pt states "all ok"

## 2025-05-01 NOTE — HISTORY OF PRESENT ILLNESS
[FreeTextEntry1] : GUY BLOOM is a 71 year old man who presents for follow up office visit for further evaluation of joint symptoms and rheumatic diseases including MCTD, osteoarthritis, osteopenia, Sjogren's syndrome, Raynaud's, and previous history of dermatomyositis, interstitial lung disease/pulmonary fibrosis, osteopenia.   Patient feels fairly well. Past few weeks myalgias bilateral thighs and right shoulder. Denies dysphagia, difficulty fixing hair, difficulty shaving, difficulty with stairs, or weakness. No rash, oral/genital ulcers, alopecia, chest pain, fever, Raynaud's, or other joint symptoms. Some  recent dry eyes and dry mouth. Using artificial tears, oral hydration and cevimeline t.i.d. with relief. No respiratory symptoms, shortness of breath or cough/sputum production, hemoptysis, ankle swelling, other joint symptoms. he has had raw beef and denies undercook pork. The patient continues calcium 500 mg and vitamin D supplements. Patient denies rash or side effects with current medications. Patient is content with current medication regimen.  [facility] Cardiology f/u - pt states "all ok"

## 2025-05-01 NOTE — CONSULT LETTER
[Dear  ___] : Dear  [unfilled], [Consult Letter:] : I had the pleasure of evaluating your patient, [unfilled]. [Please see my note below.] : Please see my note below. [Consult Closing:] : Thank you very much for allowing me to participate in the care of this patient.  If you have any questions, please do not hesitate to contact me. [Sincerely,] : Sincerely, [DrMary  ___] : Dr. KLINE [DrMary ___] : Dr. KLINE [___] : [unfilled] [FreeTextEntry3] :       Myron Myron I. Kleiner, M.D., FACR   Chief, Division of Rheumatology  Department of Medicine   [facility]   Chief, Division of Rheumatology  Department of Medicine   [facility]  Myron Myron I. Kleiner, M.D., FACR   Chief, Division of Rheumatology  Department of Medicine   [facility]   Chief, Division of Rheumatology  Department of [facility]

## 2025-05-01 NOTE — CONSULT LETTER
[Dear  ___] : Dear  [unfilled], [Consult Letter:] : I had the pleasure of evaluating your patient, [unfilled]. [Please see my note below.] : Please see my note below. [Consult Closing:] : Thank you very much for allowing me to participate in the care of this patient.  If you have any questions, please do not hesitate to contact me. [Sincerely,] : Sincerely, [DrMary  ___] : Dr. KLINE [DrMary ___] : Dr. KLINE [___] : [unfilled] [FreeTextEntry3] :       Myron Myron I. Kleiner, M.D., FACR   Chief, Division of Rheumatology  Department of Medicine   [facility]   Chief, Division of Rheumatology  Department of Medicine   [facility]  Myron Myron I. Kleiner, M.D., FACR   Chief, Division of Rheumatology  Department of Medicine   [facility]   Chief, Division of Rheumatology  Department of [facility]

## 2025-07-10 NOTE — REVIEW OF SYSTEMS
[Recent Weight Loss (___ Lbs)] : recent [unfilled] ~Ulb weight loss [Dry Eyes] : dryness of the eyes [Joint Pain] : joint pain [Negative] : Heme/Lymph [As Noted in HPI] : as noted in HPI

## 2025-07-10 NOTE — CONSULT LETTER
[Dear  ___] : Dear  [unfilled], [Consult Letter:] : I had the pleasure of evaluating your patient, [unfilled]. [Please see my note below.] : Please see my note below. [Consult Closing:] : Thank you very much for allowing me to participate in the care of this patient.  If you have any questions, please do not hesitate to contact me. [Sincerely,] : Sincerely, [DrMary  ___] : Dr. KLINE [DrMary ___] : Dr. KLINE [___] : [unfilled] [FreeTextEntry3] :       Myron Myron I. Kleiner, M.D., FACR   Chief, Division of Rheumatology  Department of Medicine   [facility]   Chief, Division of Rheumatology  Department of Medicine   [facility]  Myron Myron I. Kleiner, M.D., FACR   Chief, Division of Rheumatology  Department of Medicine   [facility]   Chief, Division of Rheumatology  Department of [facility]

## 2025-07-10 NOTE — ASSESSMENT
[FreeTextEntry1] : Impression: GUY BLOOM is a 71 year old man who presents for follow up office visit for further evaluation of joint symptoms and rheumatic diseases including MCTD, osteoarthritis, Sjogren's syndrome, osteopenia, Raynaud's, previous history of dermatomyositis, and interstitial lung disease/pulmonary fibrosis, pulmonary hypertension. Accompanied by wife Mrs. Bloom.  Patient feels fairly well. Occasional right shoulder pain secondary to osteoarthritis. Paresthesia in fingers usually in morning and at night-- consider bilateral carpal tunnel syndrome vs cervical radiculopathy. Denies dysphagia, difficulty fixing hair, difficulty shaving, difficulty with stairs, or weakness with his previous history of dermatomyositis quiescent. No recent Raynauds Episodes. No rash, oral/genital ulcers, alopecia, chest pain, fever, Raynaud's, or other joint symptoms. Some dry eyes and dry mouth - on exam, pt has eyes dry and tongue moist secondary to Sjogren's Syndrome. Using artificial tears, biotene mouthwash, oral hydration, and Cevimeline t.i.d. with relief.  No respiratory symptoms, shortness of breath or cough/sputum production, hemoptysis, ankle swelling, other joint symptoms. His interstitial lung disease and pulmonary fibrosis appears to be clinically stable at this time. By telephone, discontinued Atorvastatin, and after 3 weeks, myalgia bilateral lower extremities which he reported previously resolved. From previous exam, mild tenderness bilateral anterior thighs -- resolved after discontinuing Atorvastatin. 1 month ago, Orthopedic dx "bursitis of the right hip" steroid injection right hip one month ago with relief. Meloxicam not giving him adequate relief. Recent lab tests results revealed toxoplasma IgG pos/IgM negative, CPK normal, Adalase normal-- with extensive discussion. Recent echocardiogram results revealed mild pulmonary artery hypertension with PA pressure 34--no change from last year--- with extensive discussion--- otherwise, his MCTD has been stable.. The patient continues calcium 500 mg and vitamin D supplements for osteopenia. Patient denies rash or side effects with current medications.   Plan: I reviewed patient's chart and previous records I reviewed recent lab test results with the patient and his wife with extensive discussion  Laboratory tests ordered - see list below - with coordination of care  X-rays ordered  see list below  with coordination of care High resolution chest CAT scan ordered - with coordination of care Diagnosis and prognosis discussed Continue current medications (other than those changed below) Discontinue Meloxicam Celebrex 200 mg q.d end of supper (Possible side effects explained including cardiovascular risk/MI/CVA) Bilateral wrist splints at bedtime--with coordination of care Artificial tears one drop each eye q.i.d. and p.r.n.(Possible side effects explained) Biotene mouthwash/spray q.i.d. and p.r.n.(Possible side effects explained) Oral Hydration Sun protection and sunscreen with an SPF of 50 or higher use emphasized--extensive discussion Keep hands and feet and torso warm - patient warned of dangers of gangrene/digital amputation with Raynaud's -- with extensive discussion Increase home thermostat to at least 72 to 74 F -- with extensive discussion Pulmonary Follow-up with annual PFTs with DLCO and regarding mild Pulmonary hypertension -- with coordination of care  -- please send me a consult report--- he already has an appointment in 1 month Cardiologist f/u regarding  treatment of his hyperlipidemia-- please send me a consult report -- with coordination of care  I request consult report from  Neurology-- emphasized -- with coordination of care Return visit 2 months All questions and concerns were addressed.  Total time for this office visit, including face-to-face time and non-face-to-face time, 71 minutes--- including review of the chart and previous records, detailed review of his extensive medical history, review of previous lab results with extensive discussion with the patient and his wife, ordering lab tests with coordination of care, review of previous imaging reports/x-ray results, ordering of new x-rays with coordination of care, ordering chest CAT scan with coordination of care, reviewed recent echocardiogram results with patient and his wife with extensive discussion, detailed medication history, review of medications going forward with their possible side effects, urged pulmonary follow-up soon--she already has an appointment in 1 month, reviewed protocol for prevention of Raynaud's with extensive discussion as noted above, reviewed modalities for treatment of his dryness with extensive discussion

## 2025-07-10 NOTE — CONSULT LETTER
[Dear  ___] : Dear  [unfilled], [Consult Letter:] : I had the pleasure of evaluating your patient, [unfilled]. [Please see my note below.] : Please see my note below. [Consult Closing:] : Thank you very much for allowing me to participate in the care of this patient.  If you have any questions, please do not hesitate to contact me. [Sincerely,] : Sincerely, [DrMary  ___] : Dr. KLINE [DrMary ___] : Dr. KLINE [___] : [unfilled] [FreeTextEntry3] :       Myron Myron I. Kleiner, M.D., FACR   Chief, Division of Rheumatology  Department of Medicine   [facility]   Chief, Division of Rheumatology  Department of Medicine   [facility]  Myron Myron I. Kleiner, M.D., FACR   Chief, Division of Rheumatology  Department of Medicine   [facility]   Chief, Division of Rheumatology  [facility]
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
## 2025-07-10 NOTE — ADDENDUM
[FreeTextEntry1] :  I, Rhea Chavez, acted solely as a scribe for Dr. Myron I. Kleiner, MD. on 07/09/2025. I personally performed the services described in the documentation, reviewed the documentation recorded by the scribe in my presence, and it accurately and completely records my words and actions.